# Patient Record
Sex: MALE | Race: WHITE | Employment: FULL TIME | URBAN - METROPOLITAN AREA
[De-identification: names, ages, dates, MRNs, and addresses within clinical notes are randomized per-mention and may not be internally consistent; named-entity substitution may affect disease eponyms.]

---

## 2019-07-02 ENCOUNTER — OFFICE VISIT (OUTPATIENT)
Dept: ORTHOPEDIC SURGERY | Age: 34
End: 2019-07-02
Payer: COMMERCIAL

## 2019-07-02 VITALS
SYSTOLIC BLOOD PRESSURE: 112 MMHG | DIASTOLIC BLOOD PRESSURE: 73 MMHG | HEIGHT: 74 IN | WEIGHT: 160 LBS | HEART RATE: 68 BPM | BODY MASS INDEX: 20.53 KG/M2

## 2019-07-02 DIAGNOSIS — M25.562 ACUTE PAIN OF LEFT KNEE: Primary | ICD-10-CM

## 2019-07-02 DIAGNOSIS — S83.242A ACUTE MEDIAL MENISCUS TEAR OF LEFT KNEE, INITIAL ENCOUNTER: ICD-10-CM

## 2019-07-02 PROCEDURE — 99203 OFFICE O/P NEW LOW 30 MIN: CPT | Performed by: ORTHOPAEDIC SURGERY

## 2019-07-02 ASSESSMENT — ENCOUNTER SYMPTOMS
EYES NEGATIVE: 1
GASTROINTESTINAL NEGATIVE: 1
RESPIRATORY NEGATIVE: 1
SINUS PRESSURE: 1
SINUS PAIN: 1

## 2019-07-03 ENCOUNTER — OFFICE VISIT (OUTPATIENT)
Dept: ORTHOPEDIC SURGERY | Age: 34
End: 2019-07-03
Payer: COMMERCIAL

## 2019-07-03 VITALS
BODY MASS INDEX: 20.53 KG/M2 | HEART RATE: 82 BPM | DIASTOLIC BLOOD PRESSURE: 69 MMHG | WEIGHT: 160 LBS | HEIGHT: 74 IN | SYSTOLIC BLOOD PRESSURE: 119 MMHG | RESPIRATION RATE: 12 BRPM

## 2019-07-03 DIAGNOSIS — M67.52 PLICA SYNDROME OF LEFT KNEE: ICD-10-CM

## 2019-07-03 DIAGNOSIS — M25.562 ACUTE PAIN OF LEFT KNEE: Primary | ICD-10-CM

## 2019-07-03 DIAGNOSIS — S80.02XA CONTUSION OF LEFT KNEE, INITIAL ENCOUNTER: ICD-10-CM

## 2019-07-03 PROCEDURE — 99213 OFFICE O/P EST LOW 20 MIN: CPT | Performed by: ORTHOPAEDIC SURGERY

## 2019-07-03 PROCEDURE — 20610 DRAIN/INJ JOINT/BURSA W/O US: CPT | Performed by: ORTHOPAEDIC SURGERY

## 2019-07-03 RX ORDER — METHYLPREDNISOLONE ACETATE 40 MG/ML
80 INJECTION, SUSPENSION INTRA-ARTICULAR; INTRALESIONAL; INTRAMUSCULAR; SOFT TISSUE ONCE
Status: COMPLETED | OUTPATIENT
Start: 2019-07-03 | End: 2019-07-03

## 2019-07-03 RX ADMIN — METHYLPREDNISOLONE ACETATE 80 MG: 40 INJECTION, SUSPENSION INTRA-ARTICULAR; INTRALESIONAL; INTRAMUSCULAR; SOFT TISSUE at 14:37

## 2019-07-03 NOTE — PROGRESS NOTES
Faizan Torres returns today to follow-up his left knee MRI. Pain is 5 out of 10 today. Past medical, surgical, and social histories have been reviewed and updated. Relevant review of systems reviewed. General Exam:    Vitals: Blood pressure 119/69, pulse 82, resp. rate 12, height 6' 2\" (1.88 m), weight 160 lb (72.6 kg). Constitutional: Patient is adequately groomed with no evidence of malnutrition  Mental Status: The patient is oriented to time, place and person. The patient's mood and affect are appropriate. Left knee has significant tenderness medially over the retinaculum and joint line. He has a trace effusion. He has mild crepitation. Left knee MRI is reviewed. It demonstrates:    HISTORY:  Acute pain.       TECHNICAL FACTORS:  Long- and short-axis fat- and water-weighted images were performed.  1.5T    High Field Oval.       COMPARISON:  05/01/2009.       FINDINGS:  Extensor mechanism intact.  Laterally tilted patella.  Hypertrophic medial patella    plica.       Medial meniscus and MCL are intact.       Frayed anterior horn medial meniscus root.  LCL intact.  ACL and PCL are intact.  Small    effusion.  No loose body.       No soft tissue mass.       CONCLUSION:   1. Small effusion. No acute or high grade internal derangement. 2. Laterally tilted patella. No subluxation or dislocation. 3. Please see above. I reviewed these findings on the report and the images with the patient. Assessment: Status post left knee contusion with inflamed plica and lateral patellar tilt. Plan: Cortisone injection followed by therapy. Procedure: Under sterile technique, left knee was injected into the anterolateral arthroscopy portal with 80 mg of Depo-Medrol and 40 mg of lidocaine. He tolerated that well. He will start therapy. Follow-up with me for persistent issues in 4 weeks.

## 2019-07-10 ENCOUNTER — HOSPITAL ENCOUNTER (OUTPATIENT)
Dept: PHYSICAL THERAPY | Age: 34
Setting detail: THERAPIES SERIES
Discharge: HOME | End: 2019-07-10
Payer: COMMERCIAL

## 2019-07-10 PROCEDURE — 97530 THERAPEUTIC ACTIVITIES: CPT | Performed by: PHYSICAL THERAPIST

## 2019-07-10 PROCEDURE — 97161 PT EVAL LOW COMPLEX 20 MIN: CPT | Performed by: PHYSICAL THERAPIST

## 2019-07-10 PROCEDURE — 97110 THERAPEUTIC EXERCISES: CPT | Performed by: PHYSICAL THERAPIST

## 2019-07-10 NOTE — FLOWSHEET NOTE
Mini squats     CC TKE          Lateral band walks     Side Planks     Half moon     Single leg flow          Biodex-balance     Single leg stance     Plyoback          Stool scoots     SB bridge     SB HS Curls     Planks          PRE     Extension  RANGE: 90/40   Flexion  RANGE: 0/90        Cable Column          Leg Press  RANGE: 70/10        Bike     Treadmill                     Therapeutic Exercise and NMR EXR  [] (76788) Provided verbal/tactile cueing for activities related to strengthening, flexibility, endurance, ROM for improvements in LE, proximal hip, and core control with self care, mobility, lifting, ambulation.  [] (37812) Provided verbal/tactile cueing for activities related to improving balance, coordination, kinesthetic sense, posture, motor skill, proprioception  to assist with LE, proximal hip, and core control in self care, mobility, lifting, ambulation and eccentric single leg control.      NMR and Therapeutic Activities:    [] (79342 or 99276) Provided verbal/tactile cueing for activities related to improving balance, coordination, kinesthetic sense, posture, motor skill, proprioception and motor activation to allow for proper function of core, proximal hip and LE with self care and ADLs  [] (39571) Gait Re-education- Provided training and instruction to the patient for proper LE, core and proximal hip recruitment and positioning and eccentric body weight control with ambulation re-education including up and down stairs     Home Exercise Program:    [x] (09435) Reviewed/Progressed HEP activities related to strengthening, flexibility, endurance, ROM of core, proximal hip and LE for functional self-care, mobility, lifting and ambulation/stair navigation   [] (44328)Reviewed/Progressed HEP activities related to improving balance, coordination, kinesthetic sense, posture, motor skill, proprioception of core, proximal hip and LE for self care, mobility, lifting, and ambulation/stair navigation

## 2019-07-10 NOTE — PLAN OF CARE
patella (no subluxation or dislocation). The MD gave him an injection and sent him to PT. Relevant Medical History: seen for similar injury in 2015. Functional Scale:    WOMAC-21.35%    Pain Scale: 2/10  Easing factors: ice, ibf, injection, Aleve. He tried a little of the exercises, but they hurt. Pain at best- 2/10  Provocative factors: golf, fishing, hunting;  pain at worst- 12 with injury.   2-4/10     Type: [x] Constant   [] Intermittent  [] Radiating [] Localized [] other: constant aching, annoying pain     Numbness/Tingling: none    Functional Limitations/Impairments: [x] Sitting [x] Standing [x] Walking    [x] Squatting/bending  [x] Stairs           [x] ADL's  [x] Transfers [x] Sports/Recreation [] Other:    Occupation/School: sales     Living Status/Prior Level of Function: Independent with ADLs and IADLs,   (insert highest prior level of function)    OBJECTIVE:     Joint mobility: decreased patellar mobility and pt holds knee in flexion   [] Normal    [x] Hypo   [] Hyper    Palpation: TTP over MPFL slightly, more TTP over pes, medial jt line and medial hamstring    Functional Mobility/Transfers: see above    Posture: slight fwd head    Bandages/Dressings/Incisions: bruise form injection site    Gait: (include devices/WB status) decreased TKE         Flexibility L R Comment   Hamstring 50 SLR 60 SLR    Gastroc      ITB      Quad                ROM PROM AROM Overpressure Comment    L R L R L R    Flexion   121- popping, but no pain 145      Extension   lacking 2 0                              Strength L R Comment   Quad 3- 4+    Hamstring 4- more pain with internal tibial rotation 4+    Gastroc      Hip flexor 4- 4+    Hip ABD 3+ to 4- 4-                  Orthopedic Special Tests:   Special Test Results/Comment   Meniscal Click    Crepitus    Flexion Test    Valgus Laxity    Varus Laxity    Lachmans    Drop Back    Homans            Girth L R   Mid Patella     Suprapatellar     5cm above     15cm and no intervention required. [] Falls Risk assessed and Patient requires intervention due to being higher risk   TUG score (>12s at risk):     [] Falls education provided, including         ASSESSMENT:   Functional Impairments:     [x]Noted lumbar/proximal hip/LE joint hypomobility   [x]Decreased LE functional ROM   [x]Decreased core/proximal hip strength and neuromuscular control   [x]Decreased LE functional strength   [x]Reduced balance/proprioceptive control   []other:      Functional Activity Limitations (from functional questionnaire and intake)   [x]Reduced ability to tolerate prolonged functional positions   [x]Reduced ability or difficulty with changes of positions or transfers between positions   []Reduced ability to maintain good posture and demonstrate good body mechanics with sitting, bending, and lifting   []Reduced ability to sleep   [] Reduced ability or tolerance with driving and/or computer work   [x]Reduced ability to perform lifting, carrying tasks   [x]Reduced ability to squat   []Reduced ability to forward bend   [x]Reduced ability to ambulate prolonged functional periods/distances/surfaces   [x]Reduced ability to ascend/descend stairs   [x]Reduced ability to run, hop, cut or jump   []other:    Participation Restrictions   [x]Reduced participation in self care activities   [x]Reduced participation in home management activities   []Reduced participation in work activities   [x]Reduced participation in social activities. [x]Reduced participation in sport/recreation activities. Classification :    []Signs/symptoms consistent with post-surgical status including decreased ROM, strength and function.    []Signs/symptoms consistent with joint sprain/strain   []Signs/symptoms consistent with patella-femoral syndrome   []Signs/symptoms consistent with knee OA/hip OA   []Signs/symptoms consistent with internal derangement of knee/Hip   []Signs/symptoms consistent with functional hip weakness/NMR

## 2019-07-17 ENCOUNTER — HOSPITAL ENCOUNTER (OUTPATIENT)
Dept: PHYSICAL THERAPY | Age: 34
Setting detail: THERAPIES SERIES
Discharge: HOME | End: 2019-07-17
Payer: COMMERCIAL

## 2019-07-17 PROCEDURE — 97112 NEUROMUSCULAR REEDUCATION: CPT | Performed by: PHYSICAL THERAPIST

## 2019-07-17 PROCEDURE — 97110 THERAPEUTIC EXERCISES: CPT | Performed by: PHYSICAL THERAPIST

## 2019-07-17 NOTE — FLOWSHEET NOTE
[address], [facility] [address]                                                         Physical Therapy Daily Treatment Note  Date:  2019    Patient Name:  Naren Siegel    :  1985  MRN: [id number]    Medical/Treatment Diagnosis Information:  · Diagnosis: S80.02XA (ICD-10-CM) - Contusion of left knee, initial encounter; X56.90 (IXU-58-SF) - Plica syndrome of left knee; M25.562 (ICD-10-CM) - Acute pain of left knee  · Treatment Diagnosis: M25.562 (ICD-10-CM) - Acute pain of left knee  Insurance/Certification information:  PT Insurance Information: [employer]  Physician Information:  Referring Practitioner: [address] of care signed (Y/N):  Y    Date of Patient follow up with Physician:  Prn after 1 Aug 2019    Functional Scale:  7/10/2019    WOMAC- 21.35%    Progress Note: [x] Yes   []  No  Next due by: Visit #10  Or 8 Aug 2049     Latex Allergy:  [x] NO      [] YES  Preferred Language for Healthcare:   [x]English       []other:    Visit # Insurance Allowable   2 20     Pain level:  2/10 7/17     SUBJECTIVE:  Patient noted feeling much better after last session. Patient stated that he was able to perform all activities with minimal pain.      OBJECTIVE: See eval    Observation:    Test measurements:      RESTRICTIONS/PRECAUTIONS: h/o patellar subluxation    Exercises/Interventions:     Exercise/Equipment Resistance/Repetitions Other comments   Stretching     Hamstring 5x:30 propped   Hip Flexion     ITB     Grion     Quad     Inclined Calf 5x:30    Towel Pull          SLR     Supine 3x10    Prone 3 x 10  Added    Abduction 3 x 10  Added    Adducton 3 x 10  Added    SLR+     Clams 3x10 2# Added                   Isometrics     Quad sets 10x:10    Hip Adduction     Hamstring                    Patellar Glides     Medial     Superior     Inferior          ROM     Sheet Pulls 10x:10    Wall Slides improving balance, coordination, kinesthetic sense, posture, motor skill, proprioception of core, proximal hip and LE for self care, mobility, lifting, and ambulation/stair navigation      Manual Treatments:  PROM / STM / Oscillations-Mobs:  G-I, II, III, IV (PA's, Inf., Post.)  [] (49947) Provided manual therapy to mobilize LE, proximal hip and/or LS spine soft tissue/joints for the purpose of modulating pain, promoting relaxation,  increasing ROM, reducing/eliminating soft tissue swelling/inflammation/restriction, improving soft tissue extensibility and allowing for proper ROM for normal function with self care, mobility, lifting and ambulation. Modalities:  CP 15 ' - 7/17    Charges:  Timed Code Treatment Minutes: 45'   Total Treatment Minutes: 61'     [] EVAL (LOW) 32485   [] EVAL (MOD) 91874   [] EVAL (HIGH) 74959   [] RE-EVAL   [x] AE(54445) x 2  [] IONTO  [x] NMR (28319) x 1     [] VASO  [] Manual (95360) x      [] Other:  [] TA x   [] Mech Traction (83095)  [] ES(attended) (55973)      [] ES (un) (34575):       GOALS:  Patient stated goal: get better, move on, get back to activities.     Therapist goals for Patient:   Short Term Goals: To be achieved in: 2 weeks  1. Independent in HEP and progression per patient tolerance, in order to prevent re-injury. 2. Patient will have a decrease in pain to facilitate improvement in movement, function, and ADLs as indicated by Functional Deficits.     Long Term Goals: To be achieved in: 6 weeks  1. Disability index score of 10% or less for the [facility] to assist with reaching prior level of function. 2. Patient will demonstrate increased AROM to knee flex greater than or equal to 140 and ext equal to 0 to allow for proper joint functioning as indicated by patients Functional Deficits.    3. Patient will demonstrate an increase in Strength to in hip flex, ABD, and knee to greater than or equal to 4 to allow for proper functional mobility as indicated by patients

## 2019-07-24 ENCOUNTER — APPOINTMENT (OUTPATIENT)
Dept: PHYSICAL THERAPY | Age: 34
End: 2019-07-24
Payer: COMMERCIAL

## 2019-07-31 ENCOUNTER — HOSPITAL ENCOUNTER (OUTPATIENT)
Dept: PHYSICAL THERAPY | Age: 34
Setting detail: THERAPIES SERIES
Discharge: HOME | End: 2019-07-31
Payer: COMMERCIAL

## 2019-07-31 PROCEDURE — 97110 THERAPEUTIC EXERCISES: CPT | Performed by: PHYSICAL THERAPIST

## 2019-07-31 PROCEDURE — 97112 NEUROMUSCULAR REEDUCATION: CPT | Performed by: PHYSICAL THERAPIST

## 2019-07-31 PROCEDURE — 97530 THERAPEUTIC ACTIVITIES: CPT | Performed by: PHYSICAL THERAPIST

## 2019-07-31 NOTE — FLOWSHEET NOTE
[address] , [facility] [address]                                                         Physical Therapy Daily Treatment Note  Date:  2019    Patient Name:  Lorena Loja    :  1985  MRN: [id number]    Medical/Treatment Diagnosis Information:  · Diagnosis: S80.02XA (ICD-10-CM) - Contusion of left knee, initial encounter; X66.12 (VDB-16-ZO) - Plica syndrome of left knee; M25.562 (ICD-10-CM) - Acute pain of left knee  · Treatment Diagnosis: M25.562 (ICD-10-CM) - Acute pain of left knee  Insurance/Certification information:  PT Insurance Information: [employer]  Physician Information:  Referring Practitioner: [address] of care signed (Y/N):  Y    Date of Patient follow up with Physician:  Prn after 1 Aug 2019    Functional Scale:  7/10/2019    WOMAC- 21.35%    Progress Note: [x] Yes   []  No  Next due by: Visit #10  Or 8 Aug 2019     Latex Allergy:  [x] NO      [] YES  Preferred Language for Healthcare:   [x]English       []other:    Visit # Insurance Allowable   3 20     Pain level:  0-1/10     SUBJECTIVE:  Patient states he has felt much better lately. He has not had problems with the stairs, he even ran up them once without pain. The only thing he notices is a loud pop/click sometimes but it is not painful. After he bends his knee a couple times, it feels much better. OBJECTIVE: 2019    Observation:    - Pt presents with a bruise on his left shin. He is unsure of the cause.     Test measurements:                 ROM PROM AROM Overpressure Comment     L R L R L R     Flexion     137          Extension     1 hyperextension                                                     Strength L R Comment   Quad       Hamstring       Gastroc         Hip flexor       Hip ABD                              - Pt was TTP over L greater trochanter     RESTRICTIONS/PRECAUTIONS: h/o patellar proximal hip and LE with self care and ADLs  [] (14084) Gait Re-education- Provided training and instruction to the patient for proper LE, core and proximal hip recruitment and positioning and eccentric body weight control with ambulation re-education including up and down stairs     Home Exercise Program:    [x] (25572) Reviewed/Progressed HEP activities related to strengthening, flexibility, endurance, ROM of core, proximal hip and LE for functional self-care, mobility, lifting and ambulation/stair navigation   [] (80567)Reviewed/Progressed HEP activities related to improving balance, coordination, kinesthetic sense, posture, motor skill, proprioception of core, proximal hip and LE for self care, mobility, lifting, and ambulation/stair navigation      Manual Treatments:  PROM / STM / Oscillations-Mobs:  G-I, II, III, IV (PA's, Inf., Post.)  [] (07480) Provided manual therapy to mobilize LE, proximal hip and/or LS spine soft tissue/joints for the purpose of modulating pain, promoting relaxation,  increasing ROM, reducing/eliminating soft tissue swelling/inflammation/restriction, improving soft tissue extensibility and allowing for proper ROM for normal function with self care, mobility, lifting and ambulation. Modalities:  CP 15 '     Charges:   Timed Code Treatment Minutes: 62'   Total Treatment Minutes: 80'     [] EVAL (LOW) 455 1011   [] EVAL (MOD) 50192   [] EVAL (HIGH) 84438   [] RE-EVAL   [x] GH(54410) x 2  [] IONTO  [x] NMR (05336) x 1     [] VASO  [] Manual (45059) x      [] Other:  [x] TA x 1   [] Mech Traction (26367)  [] ES(attended) (68313)      [] ES (un) (17025):       GOALS:  Patient stated goal: get better, move on, get back to activities.     Therapist goals for Patient:   Short Term Goals: To be achieved in: 2 weeks  1. Independent in HEP and progression per patient tolerance, in order to prevent re-injury.    2. Patient will have a decrease in pain to facilitate improvement in movement, function, Prognosis: [x] Good [] Fair  [] Poor    Patient Requires Follow-up: [x] Yes  [] No    PLAN: Complete progress note next week. If pt does not return, this note can be considered a D/C note. [] Continue per plan of care [] Alter current plan (see comments)  [x] Plan of care initiated [] Hold pending MD visit [] Discharge    Electronically signed by: Georgia Covington, Student Physical Therapist  Therapist was present, directed the patient's care, made skilled judgement, and was responsible for assessment and treatment of the patient.      WILTON Barber [phone]

## 2019-08-07 ENCOUNTER — HOSPITAL ENCOUNTER (OUTPATIENT)
Dept: PHYSICAL THERAPY | Age: 34
Setting detail: THERAPIES SERIES
Discharge: HOME | End: 2019-08-07
Payer: COMMERCIAL

## 2019-08-07 PROCEDURE — 97110 THERAPEUTIC EXERCISES: CPT | Performed by: PHYSICAL THERAPIST

## 2019-08-07 PROCEDURE — 97530 THERAPEUTIC ACTIVITIES: CPT | Performed by: PHYSICAL THERAPIST

## 2019-08-07 PROCEDURE — 97112 NEUROMUSCULAR REEDUCATION: CPT | Performed by: PHYSICAL THERAPIST

## 2019-08-07 NOTE — PLAN OF CARE
have any problems, we will see him again prn. Physical Therapy Daily Treatment Note  Date:  2019    Patient Name:  Alpa Powers    :  1985  MRN: [id number]    Medical/Treatment Diagnosis Information:  · Diagnosis: S80.02XA (ICD-10-CM) - Contusion of left knee, initial encounter; F83.54 (PKA-19-LN) - Plica syndrome of left knee; M25.562 (ICD-10-CM) - Acute pain of left knee  · Treatment Diagnosis: M25.562 (ICD-10-CM) - Acute pain of left knee  Insurance/Certification information:  PT Insurance Information: [employer]  Physician Information:  Referring Practitioner: Suki Melara of care signed (Y/N):  Y    Date of Patient follow up with Physician:  Prn after 1 Aug 2019    Functional Scale:  7/10/2019    WOMAC- 21.35%    Progress Note: [x] Yes   []  No  Next due by: Visit #10  Or 8 Aug 2019     Latex Allergy:  [x] NO      [] YES  Preferred Language for Healthcare:   [x]English       []other:    Visit # Insurance Allowable   4 20     Pain level:  0.5/10     SUBJECTIVE:  He still has cracking and popping. He was able to run up the stairs to check his daughter, and he didn't have pain or problems with it. He didn't get to try to fish or golf yet. He does feel like he could fish without problems. OBJECTIVE: 2019    Observation:    - Pt presents with a bruise on his left shin. He is unsure of the cause.     Test measurements:                 ROM PROM AROM Overpressure Comment     L R L R L R     Flexion     143          Extension     0                                                    Strength L R Comment   Quad 4      Hamstring 4+      Gastroc         Hip flexor 4+      Hip ABD 4                             - Pt was TTP over L greater trochanter     RESTRICTIONS/PRECAUTIONS: h/o patellar subluxation    Exercises/Interventions:     Exercise/Equipment Resistance/Repetitions Other comments   Stretching     Hamstring 5x:30     Hip Flexion     ITB     Grion     Quad 5x:30     Inclined Calf 5x:30     Towel Pull          SLR     Supine 3 x 10 3#    Prone 3 x 10 3#    Abduction 3 x 10 3#    Adducton 3 x 10 3#    SLR+     Clams 3x10 6#                   Isometrics     Quad sets     Hip Adduction     Hamstring                    Patellar Glides     Medial     Superior     Inferior          ROM     Sheet Pulls     Wall Slides     Edge of bed                         CKC     Calf raises 3x10 SL    Wall sits 25\" x 5    Step ups     Step up and over     Lateral Step Downs               Mini squats 3x10 on rockerboard    CC TKE 3x10 4pl         Lateral band walks     Side Planks     Half moon     Single leg flow          Biodex-balance     Single leg stance 5x:30    Plyoback          Stool scoots     SB bridge     SB HS Curls     Planks          PRE     Extension  RANGE: 90/40   Flexion  RANGE: 0/90        Cable Column          Leg Press  RANGE: 70/10        Bike L5 8'     Treadmill     Golf swing with weighted club 3' outside at 25-75% effort               Therapeutic Exercise and NMR EXR  [x] (56480) Provided verbal/tactile cueing for activities related to strengthening, flexibility, endurance, ROM for improvements in LE, proximal hip, and core control with self care, mobility, lifting, ambulation.  [] (27606) Provided verbal/tactile cueing for activities related to improving balance, coordination, kinesthetic sense, posture, motor skill, proprioception  to assist with LE, proximal hip, and core control in self care, mobility, lifting, ambulation and eccentric single leg control.      NMR and Therapeutic Activities:    [x] (00141 or 81468) Provided verbal/tactile cueing for activities related to improving balance, coordination, kinesthetic sense, posture, motor skill, proprioception and motor activation to allow for proper function of core, proximal hip and LE with self care and ADLs  [] (02111) Gait Re-education- Provided training and instruction to the patient for proper LE, core and proximal hip recruitment and positioning and eccentric body weight control with ambulation re-education including up and down stairs     Home Exercise Program:    [x] (09374) Reviewed/Progressed HEP activities related to strengthening, flexibility, endurance, ROM of core, proximal hip and LE for functional self-care, mobility, lifting and ambulation/stair navigation   [] (16292)Reviewed/Progressed HEP activities related to improving balance, coordination, kinesthetic sense, posture, motor skill, proprioception of core, proximal hip and LE for self care, mobility, lifting, and ambulation/stair navigation      Manual Treatments:  PROM / STM / Oscillations-Mobs:  G-I, II, III, IV (PA's, Inf., Post.)  [] (42227) Provided manual therapy to mobilize LE, proximal hip and/or LS spine soft tissue/joints for the purpose of modulating pain, promoting relaxation,  increasing ROM, reducing/eliminating soft tissue swelling/inflammation/restriction, improving soft tissue extensibility and allowing for proper ROM for normal function with self care, mobility, lifting and ambulation. Modalities:  CP 15 '     Charges:   Timed Code Treatment Minutes: 45'   Total Treatment Minutes: 79'     [] EVAL (LOW) 455 1011   [] EVAL (MOD) 95877   [] EVAL (HIGH) 81833   [] RE-EVAL   [x] REGULO(31514) x 1  [] IONTO  [x] NMR (29255) x 1     [] VASO  [] Manual (83476) x      [] Other:  [x] TA x 1   [] Mech Traction (50494)  [] ES(attended) (12720)      [] ES (un) (06526):       GOALS:  Patient stated goal: get better, move on, get back to activities.     Therapist goals for Patient:   Short Term Goals: To be achieved in: 2 weeks  1. Independent in HEP and progression per patient tolerance, in order to prevent re-injury. -met  2. Patient will have a decrease in pain to facilitate improvement in movement, function, and ADLs as indicated by Functional Deficits. -met     Long Term Goals: To be achieved in: 6 weeks  1.  Disability index score of 10% or less for the [facility] to